# Patient Record
Sex: FEMALE | Race: WHITE | NOT HISPANIC OR LATINO | Employment: FULL TIME | URBAN - METROPOLITAN AREA
[De-identification: names, ages, dates, MRNs, and addresses within clinical notes are randomized per-mention and may not be internally consistent; named-entity substitution may affect disease eponyms.]

---

## 2018-08-13 ENCOUNTER — OFFICE VISIT (OUTPATIENT)
Dept: MEDICAL GROUP | Facility: PHYSICIAN GROUP | Age: 33
End: 2018-08-13
Payer: COMMERCIAL

## 2018-08-13 VITALS
SYSTOLIC BLOOD PRESSURE: 116 MMHG | HEIGHT: 64 IN | BODY MASS INDEX: 18.78 KG/M2 | WEIGHT: 110 LBS | DIASTOLIC BLOOD PRESSURE: 68 MMHG | RESPIRATION RATE: 18 BRPM | OXYGEN SATURATION: 100 % | HEART RATE: 71 BPM | TEMPERATURE: 98.7 F

## 2018-08-13 DIAGNOSIS — F41.1 GAD (GENERALIZED ANXIETY DISORDER): ICD-10-CM

## 2018-08-13 DIAGNOSIS — F32.81 PMDD (PREMENSTRUAL DYSPHORIC DISORDER): ICD-10-CM

## 2018-08-13 PROCEDURE — 99204 OFFICE O/P NEW MOD 45 MIN: CPT | Performed by: FAMILY MEDICINE

## 2018-08-13 RX ORDER — CITALOPRAM 20 MG/1
20 TABLET ORAL DAILY
Qty: 30 TAB | Refills: 3 | Status: SHIPPED | OUTPATIENT
Start: 2018-08-13 | End: 2018-12-04 | Stop reason: SDUPTHER

## 2018-08-13 RX ORDER — IBUPROFEN 200 MG
400 TABLET ORAL EVERY 6 HOURS PRN
COMMUNITY

## 2018-08-13 ASSESSMENT — ENCOUNTER SYMPTOMS
BLURRED VISION: 0
BRUISES/BLEEDS EASILY: 0
HEADACHES: 0
PALPITATIONS: 0
IRRITABILITY: 1
DIZZINESS: 0
HALLUCINATIONS: 0
TINGLING: 0
CHILLS: 0
COUGH: 0
INSOMNIA: 1
HEMOPTYSIS: 0
HEARTBURN: 0
MYALGIAS: 0
DOUBLE VISION: 0
NEUROLOGICAL NEGATIVE: 1
NAUSEA: 0
DEPRESSED MOOD: 1
GASTROINTESTINAL NEGATIVE: 1
RESPIRATORY NEGATIVE: 1
MUSCULOSKELETAL NEGATIVE: 1
DEPRESSION: 1
NERVOUS/ANXIOUS: 1
EYES NEGATIVE: 1
CARDIOVASCULAR NEGATIVE: 1
FEVER: 0

## 2018-08-13 ASSESSMENT — LIFESTYLE VARIABLES: SUBSTANCE_ABUSE: 0

## 2018-08-13 NOTE — PROGRESS NOTES
Subjective:      Bernie Smith is a 32 y.o. female who presents with Anxiety and Depression            Recently with anxiety and depression seems to be worse around her menses  No inciting social event  Works as a   Trouble both going to sleep and staying there  Will start on celexa and f/u in 4 weeks    1. NICK (generalized anxiety disorder)    - Multiple Vitamin (MULTI-VITAMIN DAILY PO); Take  by mouth.  - ibuprofen (MOTRIN) 200 MG Tab; Take 200 mg by mouth every 6 hours as needed.  - citalopram (CELEXA) 20 MG Tab; Take 1 Tab by mouth every day.  Dispense: 30 Tab; Refill: 3    2. PMDD (premenstrual dysphoric disorder)    - Multiple Vitamin (MULTI-VITAMIN DAILY PO); Take  by mouth.  - ibuprofen (MOTRIN) 200 MG Tab; Take 200 mg by mouth every 6 hours as needed.  - citalopram (CELEXA) 20 MG Tab; Take 1 Tab by mouth every day.  Dispense: 30 Tab; Refill: 3    History reviewed. No pertinent past medical history.  Past Surgical History:  No date: OPEN REDUCTION      Comment:  left tib fib  No date: TUBAL COAGULATION LAPAROSCOPIC BILATERAL  No date: TUBE & ECTOPIC PREG., REMOVAL; Left  Smoking status: Never Smoker                                                              Smokeless tobacco: Never Used                      Alcohol use: Yes           0.6 oz/week     Cans of beer: 1 per week    History reviewed.  No pertinent family history.      Current Outpatient Prescriptions: •  Multiple Vitamin (MULTI-VITAMIN DAILY PO), Take  by mouth., Disp: , Rfl: •  citalopram (CELEXA) 20 MG Tab, Take 1 Tab by mouth every day., Disp: 30 Tab, Rfl: 3•  ibuprofen (MOTRIN) 200 MG Tab, Take 200 mg by mouth every 6 hours as needed., Disp: , Rfl:     Patient was instructed on the use of medications, either prescriptions or OTC and informed on when the appropriate follow up time period should be. In addition, patient was also instructed that should any acute worsening occur that they should notify this clinic asap or call  "911.          Anxiety   Presents for initial visit. Onset was 1 to 6 months ago. The problem has been gradually worsening. Symptoms include depressed mood, excessive worry, insomnia, irritability and nervous/anxious behavior. Patient reports no chest pain, dizziness, nausea, palpitations or suicidal ideas. Symptoms occur most days. The severity of symptoms is causing significant distress. Nothing aggravates the symptoms. The quality of sleep is fair. Nighttime awakenings: several.     There are no known risk factors. There is no history of anemia or anxiety/panic attacks. Past treatments include lifestyle changes. The treatment provided no relief. Compliance with prior treatments has been good.       Review of Systems   Constitutional: Positive for irritability. Negative for chills and fever.   HENT: Negative.  Negative for hearing loss.    Eyes: Negative.  Negative for blurred vision and double vision.   Respiratory: Negative.  Negative for cough and hemoptysis.    Cardiovascular: Negative.  Negative for chest pain and palpitations.   Gastrointestinal: Negative.  Negative for heartburn and nausea.   Genitourinary: Negative.  Negative for dysuria.   Musculoskeletal: Negative.  Negative for myalgias.   Skin: Negative.  Negative for rash.   Neurological: Negative.  Negative for dizziness, tingling and headaches.   Endo/Heme/Allergies: Negative.  Does not bruise/bleed easily.   Psychiatric/Behavioral: Positive for depression. Negative for hallucinations, substance abuse and suicidal ideas. The patient is nervous/anxious and has insomnia.    All other systems reviewed and are negative.         Objective:     /68   Pulse 71   Temp 37.1 °C (98.7 °F)   Resp 18   Ht 1.619 m (5' 3.75\")   Wt 49.9 kg (110 lb)   SpO2 100%   BMI 19.03 kg/m²      Physical Exam   Constitutional: She is oriented to person, place, and time. She appears well-developed and well-nourished. No distress.   HENT:   Head: Normocephalic and " atraumatic.   Mouth/Throat: Oropharynx is clear and moist. No oropharyngeal exudate.   Eyes: Pupils are equal, round, and reactive to light.   Cardiovascular: Normal rate, regular rhythm, normal heart sounds and intact distal pulses.  Exam reveals no gallop and no friction rub.    No murmur heard.  Pulmonary/Chest: Effort normal and breath sounds normal. No respiratory distress. She has no wheezes. She has no rales. She exhibits no tenderness.   Neurological: She is alert and oriented to person, place, and time.   Skin: She is not diaphoretic.   Psychiatric: Her behavior is normal. Judgment and thought content normal. Her mood appears anxious. Thought content is not delusional. She exhibits a depressed mood. She expresses no suicidal plans and no homicidal plans.   Nursing note and vitals reviewed.              Assessment/Plan:     1. NICK (generalized anxiety disorder)    - Multiple Vitamin (MULTI-VITAMIN DAILY PO); Take  by mouth.  - ibuprofen (MOTRIN) 200 MG Tab; Take 200 mg by mouth every 6 hours as needed.  - citalopram (CELEXA) 20 MG Tab; Take 1 Tab by mouth every day.  Dispense: 30 Tab; Refill: 3    2. PMDD (premenstrual dysphoric disorder)    - Multiple Vitamin (MULTI-VITAMIN DAILY PO); Take  by mouth.  - ibuprofen (MOTRIN) 200 MG Tab; Take 200 mg by mouth every 6 hours as needed.  - citalopram (CELEXA) 20 MG Tab; Take 1 Tab by mouth every day.  Dispense: 30 Tab; Refill: 3

## 2018-09-20 ENCOUNTER — GYNECOLOGY VISIT (OUTPATIENT)
Dept: OBGYN | Facility: CLINIC | Age: 33
End: 2018-09-20
Payer: COMMERCIAL

## 2018-09-20 ENCOUNTER — HOSPITAL ENCOUNTER (OUTPATIENT)
Facility: MEDICAL CENTER | Age: 33
End: 2018-09-20
Attending: OBSTETRICS & GYNECOLOGY
Payer: COMMERCIAL

## 2018-09-20 VITALS — WEIGHT: 131 LBS | DIASTOLIC BLOOD PRESSURE: 60 MMHG | SYSTOLIC BLOOD PRESSURE: 102 MMHG | BODY MASS INDEX: 22.66 KG/M2

## 2018-09-20 DIAGNOSIS — Z01.419 WELL WOMAN EXAM: ICD-10-CM

## 2018-09-20 DIAGNOSIS — Z12.4 ROUTINE CERVICAL SMEAR: ICD-10-CM

## 2018-09-20 PROCEDURE — 88175 CYTOPATH C/V AUTO FLUID REDO: CPT

## 2018-09-20 PROCEDURE — 87624 HPV HI-RISK TYP POOLED RSLT: CPT

## 2018-09-20 PROCEDURE — 99385 PREV VISIT NEW AGE 18-39: CPT | Performed by: OBSTETRICS & GYNECOLOGY

## 2018-09-20 NOTE — PROGRESS NOTES
Annual examination; new patient  This patient is a 32 y.o. female  using BTL for birth control.        /60   Wt 59.4 kg (131 lb)   LMP 2018   BMI 22.66 kg/m²     Physical examination;  Breast examination- No dominant masses, No skin retraction, No axillary adenopathy    Pelvic examination;  External genitalia-No visible lesions  Vagina-No blood or discharge  Cervix-No gross lesions, Pap smear taken  Uterus-Normal size and shape,  No tenderness  Adnexa No mass or tenderness    Impression;  Normal annual    Plan;  [facility] for [facility]   Check PAP  Start Mammogram age 39 yrs.

## 2018-09-21 LAB
CYTOLOGY REG CYTOL: NORMAL
HPV HR 12 DNA CVX QL NAA+PROBE: NEGATIVE
HPV16 DNA SPEC QL NAA+PROBE: NEGATIVE
HPV18 DNA SPEC QL NAA+PROBE: NEGATIVE
SPECIMEN SOURCE: NORMAL

## 2018-12-04 ENCOUNTER — OFFICE VISIT (OUTPATIENT)
Dept: MEDICAL GROUP | Facility: PHYSICIAN GROUP | Age: 33
End: 2018-12-04
Payer: COMMERCIAL

## 2018-12-04 VITALS
SYSTOLIC BLOOD PRESSURE: 100 MMHG | HEIGHT: 63 IN | TEMPERATURE: 98.9 F | OXYGEN SATURATION: 98 % | HEART RATE: 74 BPM | BODY MASS INDEX: 19.31 KG/M2 | DIASTOLIC BLOOD PRESSURE: 62 MMHG | WEIGHT: 109 LBS

## 2018-12-04 DIAGNOSIS — F32.81 PMDD (PREMENSTRUAL DYSPHORIC DISORDER): ICD-10-CM

## 2018-12-04 DIAGNOSIS — F41.1 GAD (GENERALIZED ANXIETY DISORDER): ICD-10-CM

## 2018-12-04 PROCEDURE — 99213 OFFICE O/P EST LOW 20 MIN: CPT | Performed by: FAMILY MEDICINE

## 2018-12-04 RX ORDER — CITALOPRAM 40 MG/1
40 TABLET ORAL DAILY
Qty: 30 TAB | Refills: 3 | Status: SHIPPED | OUTPATIENT
Start: 2018-12-04 | End: 2022-04-18

## 2018-12-04 ASSESSMENT — PATIENT HEALTH QUESTIONNAIRE - PHQ9
CLINICAL INTERPRETATION OF PHQ2 SCORE: 2
5. POOR APPETITE OR OVEREATING: 1 - SEVERAL DAYS
SUM OF ALL RESPONSES TO PHQ QUESTIONS 1-9: 8

## 2018-12-05 NOTE — PROGRESS NOTES
Over 50% of this 15 minute visit was spent on counseling and coordination of care regarding the patient's current problem of   1. NICK (generalized anxiety disorder)  Improved with 20 mg dose for several months but seems to have leveled out again with some symptoms of anxiety and decreased mood and difficulty in managing emotions and trouble sleeping  Will increase dose and f/u in 6 weeks  No si/hi  - citalopram (CELEXA) 40 MG Tab; Take 1 Tab by mouth every day.  Dispense: 30 Tab; Refill: 3    2. PMDD (premenstrual dysphoric disorder)    - citalopram (CELEXA) 40 MG Tab; Take 1 Tab by mouth every day.  Dispense: 30 Tab; Refill: 3    Past Medical History:   Diagnosis Date   • Anemia    • Urinary tract infection      Past Surgical History:   Procedure Laterality Date   • CHOLECYSTECTOMY     • OPEN REDUCTION      left tib fib   • TUBAL COAGULATION LAPAROSCOPIC BILATERAL     • TUBAL LIGATION     • TUBE & ECTOPIC PREG., REMOVAL Left      Social History   Substance Use Topics   • Smoking status: Never Smoker   • Smokeless tobacco: Never Used   • Alcohol use 0.6 oz/week     1 Cans of beer per week     Family History   Problem Relation Age of Onset   • No Known Problems Mother    • No Known Problems Father    • No Known Problems Sister    • No Known Problems Brother          Current Outpatient Prescriptions:   •  citalopram (CELEXA) 40 MG Tab, Take 1 Tab by mouth every day., Disp: 30 Tab, Rfl: 3  •  Multiple Vitamin (MULTI-VITAMIN DAILY PO), Take  by mouth., Disp: , Rfl:   •  ibuprofen (MOTRIN) 200 MG Tab, Take 200 mg by mouth every 6 hours as needed., Disp: , Rfl:     Patient was instructed on the use of medications, either prescriptions or OTC and informed on when the appropriate follow up time period should be. In addition, patient was also instructed that should any acute worsening occur that they should notify this clinic asap or call 911.

## 2019-01-24 ENCOUNTER — OFFICE VISIT (OUTPATIENT)
Dept: MEDICAL GROUP | Facility: PHYSICIAN GROUP | Age: 34
End: 2019-01-24
Payer: COMMERCIAL

## 2019-01-24 VITALS
HEART RATE: 71 BPM | DIASTOLIC BLOOD PRESSURE: 60 MMHG | BODY MASS INDEX: 19.49 KG/M2 | SYSTOLIC BLOOD PRESSURE: 118 MMHG | RESPIRATION RATE: 12 BRPM | WEIGHT: 110 LBS | TEMPERATURE: 98.6 F | HEIGHT: 63 IN

## 2019-01-24 DIAGNOSIS — H65.193 ACUTE MEE (MIDDLE EAR EFFUSION), BILATERAL: ICD-10-CM

## 2019-01-24 DIAGNOSIS — R42 VERTIGO: ICD-10-CM

## 2019-01-24 PROCEDURE — 99214 OFFICE O/P EST MOD 30 MIN: CPT | Performed by: FAMILY MEDICINE

## 2019-01-24 RX ORDER — MECLIZINE HYDROCHLORIDE 25 MG/1
25 TABLET ORAL
Qty: 30 TAB | Refills: 1 | Status: SHIPPED | OUTPATIENT
Start: 2019-01-24 | End: 2023-08-31

## 2019-01-24 ASSESSMENT — ENCOUNTER SYMPTOMS
NAUSEA: 0
CARDIOVASCULAR NEGATIVE: 1
PSYCHIATRIC NEGATIVE: 1
SORE THROAT: 0
MYALGIAS: 0
DIZZINESS: 1
BLURRED VISION: 0
COUGH: 0
SWOLLEN GLANDS: 0
HEADACHES: 0
ARTHRALGIAS: 0
TINGLING: 0
ANOREXIA: 0
ABDOMINAL PAIN: 0
DEPRESSION: 0
HEARTBURN: 0
FEVER: 0
HEMOPTYSIS: 0
RESPIRATORY NEGATIVE: 1
CONSTITUTIONAL NEGATIVE: 1
PALPITATIONS: 0
CHILLS: 0
EYES NEGATIVE: 1
GASTROINTESTINAL NEGATIVE: 1
BRUISES/BLEEDS EASILY: 0
DOUBLE VISION: 0
CHANGE IN BOWEL HABIT: 0
MUSCULOSKELETAL NEGATIVE: 1

## 2019-01-24 ASSESSMENT — PATIENT HEALTH QUESTIONNAIRE - PHQ9: CLINICAL INTERPRETATION OF PHQ2 SCORE: 0

## 2019-01-25 NOTE — PROGRESS NOTES
Subjective:      Bernie Smith is a 33 y.o. female who presents with Dizziness            1. Vertigo    - meclizine (ANTIVERT) 25 MG Tab; Take 1 Tab by mouth at bedtime as needed for Dizziness.  Dispense: 30 Tab; Refill: 1    2. Acute NITIN (middle ear effusion), bilateral    - meclizine (ANTIVERT) 25 MG Tab; Take 1 Tab by mouth at bedtime as needed for Dizziness.  Dispense: 30 Tab; Refill: 1    Past Medical History:  No date: Anemia  No date: Urinary tract infection  Past Surgical History:  No date: CHOLECYSTECTOMY  No date: OPEN REDUCTION      Comment:  left tib fib  No date: TUBAL COAGULATION LAPAROSCOPIC BILATERAL  No date: TUBAL LIGATION  No date: TUBE & ECTOPIC PREG., REMOVAL; Left  Smoking status: Never Smoker                                                              Smokeless tobacco: Never Used                      Alcohol use: Yes           0.6 oz/week     Cans of beer: 1 per week    Review of patient's family history indicates:  Problem: No Known Problems      Relation: Mother       Age of Onset: (Not Specified)   Problem: No Known Problems      Relation: Father       Age of Onset: (Not Specified)   Problem: No Known Problems      Relation: Sister       Age of Onset: (Not Specified)   Problem: No Known Problems      Relation: Brother       Age of Onset: (Not Specified)       Current Outpatient Prescriptions: •  meclizine (ANTIVERT) 25 MG Tab, Take 1 Tab by mouth at bedtime as needed for Dizziness., Disp: 30 Tab, Rfl: 1•  citalopram (CELEXA) 40 MG Tab, Take 1 Tab by mouth every day., Disp: 30 Tab, Rfl: 3•  Multiple Vitamin (MULTI-VITAMIN DAILY PO), Take  by mouth., Disp: , Rfl: •  ibuprofen (MOTRIN) 200 MG Tab, Take 200 mg by mouth every 6 hours as needed., Disp: , Rfl:     Patient was instructed on the use of medications, either prescriptions or OTC and informed on when the appropriate follow up time period should be. In addition, patient was also instructed that should any acute worsening occur that they  "should notify this clinic asap or call 911.          Dizziness   This is a new problem. The current episode started in the past 7 days. The problem occurs intermittently. The problem has been waxing and waning. Pertinent negatives include no abdominal pain, anorexia, arthralgias, change in bowel habit, chest pain, chills, coughing, fever, headaches, myalgias, nausea, rash, sore throat or swollen glands. She has tried rest for the symptoms. The treatment provided no relief.       Review of Systems   Constitutional: Negative.  Negative for chills and fever.   HENT: Negative.  Negative for hearing loss and sore throat.    Eyes: Negative.  Negative for blurred vision and double vision.   Respiratory: Negative.  Negative for cough and hemoptysis.    Cardiovascular: Negative.  Negative for chest pain and palpitations.   Gastrointestinal: Negative.  Negative for abdominal pain, anorexia, change in bowel habit, heartburn and nausea.   Genitourinary: Negative.  Negative for dysuria.   Musculoskeletal: Negative.  Negative for arthralgias and myalgias.   Skin: Negative.  Negative for rash.   Neurological: Positive for dizziness. Negative for tingling and headaches.   Endo/Heme/Allergies: Negative.  Does not bruise/bleed easily.   Psychiatric/Behavioral: Negative.  Negative for depression and suicidal ideas.   All other systems reviewed and are negative.         Objective:     /60 (BP Location: Left arm, Patient Position: Sitting)   Pulse 71   Temp 37 °C (98.6 °F)   Resp 12   Ht 1.6 m (5' 3\")   Wt 49.9 kg (110 lb)   BMI 19.49 kg/m²      Physical Exam   Constitutional: She is oriented to person, place, and time. She appears well-developed and well-nourished. No distress.   HENT:   Head: Normocephalic and atraumatic.   Right Ear: A middle ear effusion is present.   Left Ear: A middle ear effusion is present.   Mouth/Throat: Oropharynx is clear and moist. No oropharyngeal exudate.   Eyes: Pupils are equal, round, and " reactive to light.   Cardiovascular: Normal rate, regular rhythm, normal heart sounds and intact distal pulses.  Exam reveals no gallop and no friction rub.    No murmur heard.  Pulmonary/Chest: Effort normal and breath sounds normal. No respiratory distress. She has no wheezes. She has no rales. She exhibits no tenderness.   Neurological: She is alert and oriented to person, place, and time.   Skin: She is not diaphoretic.   Psychiatric: She has a normal mood and affect. Her behavior is normal. Judgment and thought content normal.   Nursing note and vitals reviewed.              Assessment/Plan:     1. Vertigo    - meclizine (ANTIVERT) 25 MG Tab; Take 1 Tab by mouth at bedtime as needed for Dizziness.  Dispense: 30 Tab; Refill: 1    2. Acute NITIN (middle ear effusion), bilateral    - meclizine (ANTIVERT) 25 MG Tab; Take 1 Tab by mouth at bedtime as needed for Dizziness.  Dispense: 30 Tab; Refill: 1

## 2020-10-23 ENCOUNTER — OFFICE VISIT (OUTPATIENT)
Dept: URGENT CARE | Facility: CLINIC | Age: 35
End: 2020-10-23
Payer: COMMERCIAL

## 2020-10-23 ENCOUNTER — HOSPITAL ENCOUNTER (OUTPATIENT)
Facility: MEDICAL CENTER | Age: 35
End: 2020-10-23
Attending: PHYSICIAN ASSISTANT
Payer: COMMERCIAL

## 2020-10-23 VITALS
OXYGEN SATURATION: 97 % | HEART RATE: 62 BPM | BODY MASS INDEX: 20.19 KG/M2 | RESPIRATION RATE: 16 BRPM | SYSTOLIC BLOOD PRESSURE: 108 MMHG | WEIGHT: 114 LBS | TEMPERATURE: 98 F | DIASTOLIC BLOOD PRESSURE: 68 MMHG

## 2020-10-23 DIAGNOSIS — R31.9 HEMATURIA, UNSPECIFIED TYPE: ICD-10-CM

## 2020-10-23 DIAGNOSIS — R30.0 DYSURIA: ICD-10-CM

## 2020-10-23 DIAGNOSIS — R35.0 URINARY FREQUENCY: ICD-10-CM

## 2020-10-23 LAB
APPEARANCE UR: NORMAL
BILIRUB UR STRIP-MCNC: NEGATIVE MG/DL
COLOR UR AUTO: YELLOW
GLUCOSE UR STRIP.AUTO-MCNC: NEGATIVE MG/DL
INT CON NEG: NORMAL
INT CON POS: NORMAL
KETONES UR STRIP.AUTO-MCNC: NEGATIVE MG/DL
LEUKOCYTE ESTERASE UR QL STRIP.AUTO: NEGATIVE
NITRITE UR QL STRIP.AUTO: NEGATIVE
PH UR STRIP.AUTO: 7.5 [PH] (ref 5–8)
POC URINE PREGNANCY TEST: NEGATIVE
PROT UR QL STRIP: NEGATIVE MG/DL
RBC UR QL AUTO: NEGATIVE
SP GR UR STRIP.AUTO: 1.02
UROBILINOGEN UR STRIP-MCNC: 0.2 MG/DL

## 2020-10-23 PROCEDURE — 87186 SC STD MICRODIL/AGAR DIL: CPT

## 2020-10-23 PROCEDURE — 81002 URINALYSIS NONAUTO W/O SCOPE: CPT | Performed by: PHYSICIAN ASSISTANT

## 2020-10-23 PROCEDURE — 99214 OFFICE O/P EST MOD 30 MIN: CPT | Performed by: PHYSICIAN ASSISTANT

## 2020-10-23 PROCEDURE — 81025 URINE PREGNANCY TEST: CPT | Performed by: PHYSICIAN ASSISTANT

## 2020-10-23 PROCEDURE — 87086 URINE CULTURE/COLONY COUNT: CPT

## 2020-10-23 PROCEDURE — 87077 CULTURE AEROBIC IDENTIFY: CPT

## 2020-10-23 RX ORDER — CEPHALEXIN 500 MG/1
1000 CAPSULE ORAL EVERY 6 HOURS
Qty: 112 CAP | Refills: 0 | Status: SHIPPED | OUTPATIENT
Start: 2020-10-23 | End: 2020-11-06

## 2020-10-23 RX ORDER — NITROFURANTOIN 25; 75 MG/1; MG/1
100 CAPSULE ORAL EVERY 12 HOURS
Qty: 10 CAP | Refills: 0 | Status: SHIPPED | OUTPATIENT
Start: 2020-10-23 | End: 2020-10-28

## 2020-10-23 ASSESSMENT — ENCOUNTER SYMPTOMS
FLANK PAIN: 1
VOMITING: 0
HEADACHES: 0
DIAPHORESIS: 0
PALPITATIONS: 0
WEAKNESS: 0
CONSTIPATION: 0
DIZZINESS: 0
TINGLING: 0
CHILLS: 0
NAUSEA: 0
BLOOD IN STOOL: 0
WEIGHT LOSS: 0
DIARRHEA: 0
ABDOMINAL PAIN: 0
MYALGIAS: 0
SHORTNESS OF BREATH: 0
COUGH: 0
FEVER: 0

## 2020-10-23 NOTE — PROGRESS NOTES
Subjective:   Bernie Smith is a 35 y.o. female who presents for UTI (lower back pain, blood in the urine, urgency and burning started 6 days ago)      HPI:  This is a very pleasant 35-year-old female presenting to the clinic with dysuria x6 days.  Patient states she has mild burning with every urination.  She also experiences urinary frequency.  In the morning she notices mild hematuria.  Over the last 2 days she has had some nonspecific low back pain.  The pain is described as a dull ache.  She denies any radiating pain.  Pain is not brought on by movement.  She denies any associated fevers, chills, nausea, vomiting or abdominal pain.  She denies any vaginal rashes, lesions or discharge.  She has no concerns for STDs at this time.  She has been increasing fluid intake as well as taking Azo and drinking cranberry juice with minimal relief.  She states her symptoms started bad resolved for a day or so and now have returned.    Review of Systems   Constitutional: Negative for chills, diaphoresis, fever, malaise/fatigue and weight loss.   HENT: Negative for congestion.    Respiratory: Negative for cough and shortness of breath.    Cardiovascular: Negative for chest pain and palpitations.   Gastrointestinal: Negative for abdominal pain, blood in stool, constipation, diarrhea, melena, nausea and vomiting.   Genitourinary: Positive for dysuria, flank pain, frequency and hematuria. Negative for urgency.        Denies any vaginal rashes lesions or discharge.   Musculoskeletal: Negative for myalgias.   Skin: Negative for rash.   Neurological: Negative for dizziness, tingling, weakness and headaches.       Medications:    • cephALEXin Caps  • citalopram Tabs  • ibuprofen Tabs  • meclizine Tabs  • MULTI-VITAMIN DAILY PO  • nitrofurantoin Caps    Allergies: Morphine    Problem List: Bernie Smith does not have a problem list on file.    Surgical History:  Past Surgical History:   Procedure Laterality Date   • CHOLECYSTECTOMY      • OPEN REDUCTION      left tib fib   • TUBAL COAGULATION LAPAROSCOPIC BILATERAL     • TUBAL LIGATION     • TUBE & ECTOPIC PREG., REMOVAL Left        Past Social Hx: Bernie Smith  reports that she has never smoked. She has never used smokeless tobacco. She reports current alcohol use of about 0.6 oz of alcohol per week. She reports that she does not use drugs.     Past Family Hx:  Bernie Smith family history includes No Known Problems in her brother, father, mother, and sister.     Problem list, medications, and allergies reviewed by myself today in Epic.     Objective:     /68   Pulse 62   Temp 36.7 °C (98 °F)   Resp 16   Wt 51.7 kg (114 lb)   SpO2 97%   BMI 20.19 kg/m²     Physical Exam  Constitutional:       General: She is not in acute distress.     Appearance: Normal appearance. She is not ill-appearing, toxic-appearing or diaphoretic.   HENT:      Head: Normocephalic and atraumatic.      Mouth/Throat:      Mouth: Mucous membranes are moist.      Pharynx: No oropharyngeal exudate or posterior oropharyngeal erythema.   Eyes:      Conjunctiva/sclera: Conjunctivae normal.   Neck:      Musculoskeletal: Normal range of motion. No muscular tenderness.   Cardiovascular:      Rate and Rhythm: Normal rate and regular rhythm.      Pulses: Normal pulses.      Heart sounds: Normal heart sounds.   Pulmonary:      Effort: Pulmonary effort is normal.      Breath sounds: Normal breath sounds. No wheezing.   Abdominal:      General: Bowel sounds are normal. There is no distension.      Palpations: Abdomen is soft. There is no mass.      Tenderness: There is no abdominal tenderness. There is no right CVA tenderness, left CVA tenderness, guarding or rebound.      Hernia: No hernia is present.   Musculoskeletal: Normal range of motion.   Lymphadenopathy:      Cervical: No cervical adenopathy.   Skin:     General: Skin is warm and dry.      Capillary Refill: Capillary refill takes less than 2 seconds.   Neurological:       General: No focal deficit present.      Mental Status: She is alert and oriented to person, place, and time. Mental status is at baseline.   Psychiatric:         Mood and Affect: Mood normal.         Thought Content: Thought content normal.       Urine analysis: Within normal limits.  No leukocytes.  No nitrites.  No blood.  Urine culture: Pending  Urine hCG: Negative    Assessment/Plan:     Diagnosis and associated orders:     1. Dysuria  Urine Culture    POCT Urinalysis    POCT PREGNANCY    cephALEXin (KEFLEX) 500 MG Cap    nitrofurantoin (MACROBID) 100 MG Cap   2. Hematuria, unspecified type     3. Urinary frequency        Comments/MDM:     - Pt educated on preventative measures for avoiding future UTIs  - Advised to increase fluid intake  - OTC Pyridium (Azo) for symptomatic relief, advised that it will turn urine orange in color  -Patient is requesting to start on empiric antibiotic therapy while awaiting culture results.  - Pending urine culture  - ER precautions given regarding pyelonephritis including fevers greater than 101 and, vomiting and dehydration, increased back pain.           Differential diagnosis, natural history, supportive care, and indications for immediate follow-up discussed.    Advised the patient to follow-up with the primary care physician for recheck, reevaluation, and consideration of further management.    Please note that this dictation was created using voice recognition software. I have made reasonable attempt to correct obvious errors, but I expect that there are errors of grammar and possibly content that I did not discover before finalizing the note.    This note was electronically signed by SNEHAL Huggins PA-C

## 2020-10-24 DIAGNOSIS — R30.0 DYSURIA: ICD-10-CM

## 2020-10-26 LAB
BACTERIA UR CULT: ABNORMAL
BACTERIA UR CULT: ABNORMAL
SIGNIFICANT IND 70042: ABNORMAL
SITE SITE: ABNORMAL
SOURCE SOURCE: ABNORMAL

## 2022-05-12 PROBLEM — N92.4 MENORRHAGIA, PREMENOPAUSAL: Status: ACTIVE | Noted: 2022-05-12

## 2022-05-14 PROBLEM — N92.4 MENORRHAGIA, PREMENOPAUSAL: Status: RESOLVED | Noted: 2022-05-12 | Resolved: 2022-05-14

## 2022-05-14 PROBLEM — E61.1 IRON DEFICIENCY: Status: ACTIVE | Noted: 2022-05-14

## 2023-09-12 PROBLEM — E61.1 IRON DEFICIENCY: Status: RESOLVED | Noted: 2022-05-14 | Resolved: 2023-09-12

## 2023-09-18 ENCOUNTER — NON-PROVIDER VISIT (OUTPATIENT)
Dept: URGENT CARE | Facility: CLINIC | Age: 38
End: 2023-09-18

## 2023-09-18 DIAGNOSIS — Z02.1 PRE-EMPLOYMENT DRUG SCREENING: ICD-10-CM

## 2023-09-18 PROCEDURE — 8907 PR URINE COLLECT ONLY: Performed by: STUDENT IN AN ORGANIZED HEALTH CARE EDUCATION/TRAINING PROGRAM

## 2023-09-18 NOTE — PROGRESS NOTES
Pt in clinic for pre-employment UDS for [employer].    Specimen was collected without incident.    See scanned documentation for further.

## 2025-04-11 ENCOUNTER — APPOINTMENT (OUTPATIENT)
Dept: RADIOLOGY | Facility: MEDICAL CENTER | Age: 40
End: 2025-04-11
Attending: FAMILY MEDICINE
Payer: COMMERCIAL

## 2025-04-11 DIAGNOSIS — Z12.31 VISIT FOR SCREENING MAMMOGRAM: ICD-10-CM

## 2025-04-11 PROCEDURE — 77067 SCR MAMMO BI INCL CAD: CPT

## 2025-05-09 ENCOUNTER — HOSPITAL ENCOUNTER (OUTPATIENT)
Dept: RADIOLOGY | Facility: MEDICAL CENTER | Age: 40
End: 2025-05-09
Attending: FAMILY MEDICINE
Payer: COMMERCIAL

## 2025-05-09 DIAGNOSIS — R92.8 ABNORMAL MAMMOGRAM: ICD-10-CM

## 2025-05-09 PROCEDURE — 77065 DX MAMMO INCL CAD UNI: CPT | Mod: LT

## 2025-05-29 ENCOUNTER — HOSPITAL ENCOUNTER (OUTPATIENT)
Dept: RADIOLOGY | Facility: MEDICAL CENTER | Age: 40
End: 2025-05-29
Attending: FAMILY MEDICINE
Payer: COMMERCIAL

## 2025-05-29 DIAGNOSIS — R92.8 ABNORMAL FINDING ON BREAST IMAGING: ICD-10-CM

## 2025-05-29 LAB — PATHOLOGY CONSULT NOTE: NORMAL

## 2025-05-29 PROCEDURE — A4648 IMPLANTABLE TISSUE MARKER: HCPCS

## 2025-05-29 PROCEDURE — 88305 TISSUE EXAM BY PATHOLOGIST: CPT | Performed by: PATHOLOGY

## 2025-05-29 PROCEDURE — 88360 TUMOR IMMUNOHISTOCHEM/MANUAL: CPT | Mod: 91 | Performed by: PATHOLOGY

## 2025-06-03 ENCOUNTER — TELEPHONE (OUTPATIENT)
Facility: MEDICAL CENTER | Age: 40
End: 2025-06-03
Payer: COMMERCIAL

## 2025-06-04 ENCOUNTER — TELEPHONE (OUTPATIENT)
Facility: MEDICAL CENTER | Age: 40
End: 2025-06-04
Payer: COMMERCIAL

## 2025-06-04 DIAGNOSIS — R92.8 ABNORMAL FINDING ON BREAST IMAGING: Primary | ICD-10-CM

## 2025-06-04 DIAGNOSIS — R92.8 ABNORMAL MAMMOGRAM: ICD-10-CM

## 2025-06-04 NOTE — TELEPHONE ENCOUNTER
Attempted to contact the office of Dr. Terence Sanchez and got the answering service. Could not leave message. Faxed office path report and surgical referral request. Will contact office again to confirm receipt - tjs

## 2025-06-10 NOTE — Clinical Note
REFERRAL APPROVAL NOTICE         Sent on Kelsey 10, 2025                   Bernie Smith  [address]  [place] [address]                   Dear Ms. Smith,    After a careful review of the medical information and benefit coverage, [facility] has processed your referral. See below for additional details.    If applicable, you must be actively enrolled with your insurance for coverage of the authorized service. If you have any questions regarding your coverage, please contact your insurance directly.    REFERRAL INFORMATION   Referral #:  [id number]  Referred-To Department    Referred-By Provider:  Surgery    Clif New M.D.   [facility]      [address]  [address]  [facility] [address]  [phone] [address]  [facility] [id number] [phone]    Referral Start Date:  06/04/2025  Referral End Date:   06/04/2026           SCHEDULING  If you do not already have an appointment, please call [phone] to make an appointment.   MORE INFORMATION  As a reminder, [facility] ownership has changed, meaning this location is now owned and operated by [facility]. As such, we want to clarify that our patients should expect to receive two separate bills for the services received at [facility] - one representing the [facility] facility fees as the owner of the establishment, and the other to represent the physician's services and subsequent fees. You can speak with your insurance carrier for a pricing estimate by calling the customer service number on the back of your card and ask about charges for a hospital outpatient visit.  If you do not already have a [employer] account, sign up at: [email or web address]  You can access your medical information, make appointments, see lab results, billing information, and more.  If you have questions regarding this referral, please contact  the [facility] Referrals department at:              [phone]. Monday - Friday 7:30AM - 5:00PM.      Sincerely,  [facility]

## 2025-06-27 ENCOUNTER — OFFICE VISIT (OUTPATIENT)
Age: 40
End: 2025-06-27
Payer: COMMERCIAL

## 2025-06-27 ENCOUNTER — PATIENT OUTREACH (OUTPATIENT)
Dept: ONCOLOGY | Facility: MEDICAL CENTER | Age: 40
End: 2025-06-27

## 2025-06-27 VITALS
HEART RATE: 92 BPM | BODY MASS INDEX: 20.32 KG/M2 | DIASTOLIC BLOOD PRESSURE: 78 MMHG | WEIGHT: 119 LBS | SYSTOLIC BLOOD PRESSURE: 111 MMHG | OXYGEN SATURATION: 100 % | TEMPERATURE: 97.6 F | HEIGHT: 64 IN

## 2025-06-27 DIAGNOSIS — D05.12 DUCTAL CARCINOMA IN SITU (DCIS) OF LEFT BREAST: Primary | ICD-10-CM

## 2025-06-27 ASSESSMENT — ENCOUNTER SYMPTOMS: SLEEP DISTURBANCE: 1

## 2025-06-27 ASSESSMENT — FIBROSIS 4 INDEX: FIB4 SCORE: 0.98

## 2025-06-27 NOTE — PROGRESS NOTES
Subjective     Bernie Smith is a 39 y.o. female who presents for evaluation of a new diagnosis of left breast DCIS.  She reports that she was seen in the ED for left arm numbness and shoulder/neck pain, and was encouraged by Dr Panda to get a mammogram for routine screening due to her age.  She had no specific breast symptoms prior to the screening mammogram.    Routine self breast exams: No   Breast pain: No   Nipple discharge: No   Skin changes: No   Masses: No   Contour/nipple changes: No   Previous breast biopsy or surgery: Yes (Bilateral retropectoral implant augmentation 2019 by Dr Louis)    Age at menarche: 10  Age at menopause: premenopausal  Age at first birth: 19   (SAb 1, one set of twins)  Hormone replacement therapy: No     Family history of cancer: [facility] breast cancer age 78.     Lifetime (5-yr) breast cancer risk calculations  Tyrer-Cuzick v7: 12.4% (0.79%)  Tyrer-Cuzick v8: 10.09% (0.64%)  Jessenia model: 9.87% (0.55%)    Imaging  - Bilateral screening mammogram ([facility]) 2025: Left superior calcifications on MLO only, BIRADS 0, density C.  - Left diagnostic mammogram ([facility]) 2025: Left superior calcifications persist with some pleomorphism, BIRADS 4b, density B.  All imaging personally reviewed (internal and external images).    Pathology  Left breast stereotactic CNBx ([facility]) 2025: Ductal carcinoma in situ, grade 3, ER- VT-.  - Concordant. Tumark Q marker (migrated 2.3cm inferior to calcifications).  - No CC view obtained post-biopsy to confirm location of biopsy site.  - The patient was given a printed copy of her pathology report.    Past Medical History   Past Medical History[1]    Surgical History  Past Surgical History[2]    Family History  Family History   Problem Relation Age of Onset    Thyroid cancer Mother 25    No Known Problems Father     No Known Problems Sister     No Known Problems Brother     Breast Cancer Maternal Grandmother 78       Social  "History  Social History     Socioeconomic History    Marital status: Single     Spouse name: Not on file    Number of children: Not on file    Years of education: Not on file    Highest education level: Not on file   Occupational History    Not on file   Tobacco Use    Smoking status: Never    Smokeless tobacco: Former     Types: Chew     Quit date: 4/18/2002   Vaping Use    Vaping status: Never Used   Substance and Sexual Activity    Alcohol use: Yes     Alcohol/week: 1.2 oz     Types: 2 Glasses of wine per week     Comment: socially    Drug use: No    Sexual activity: Yes     Partners: Male     Birth control/protection: Other-See Comments     Comment: Tubal ligation 2014   Other Topics Concern    Not on file   Social History Narrative    Not on file     Social Drivers of Health     Financial Resource Strain: Not on file   Food Insecurity: Not on file   Transportation Needs: Not on file   Physical Activity: Not on file   Stress: Not on file   Social Connections: Not on file   Intimate Partner Violence: Not on file   Housing Stability: Unknown (3/28/2025)    Housing Stability Vital Sign     Unable to Pay for Housing in the Last Year: Not on file     Number of Times Moved in the Last Year: Not on file     Homeless in the Last Year: No       Review of Systems  Review of Systems   Psychiatric/Behavioral:  Positive for sleep disturbance.    All other systems reviewed and are negative.       Objective   /78 (BP Location: Right arm, Patient Position: Sitting, BP Cuff Size: Large adult)   Pulse 92   Temp 36.4 °C (97.6 °F) (Temporal)   Ht 1.626 m (5' 4\")   Wt 54 kg (119 lb)   LMP  (LMP Unknown)   SpO2 100%   BMI 20.43 kg/m²    Physical Exam  Vitals and nursing note reviewed.   Constitutional:       General: She is not in acute distress.     Appearance: Normal appearance.   HENT:      Head: Normocephalic and atraumatic.      Right Ear: External ear normal.      Left Ear: External ear normal.      Nose: Nose " normal.      Mouth/Throat:      Pharynx: Oropharynx is clear.   Eyes:      General: No scleral icterus.     Conjunctiva/sclera: Conjunctivae normal.   Cardiovascular:      Rate and Rhythm: Normal rate and regular rhythm.      Heart sounds: Normal heart sounds. No murmur heard.     No friction rub. No gallop.   Pulmonary:      Effort: Pulmonary effort is normal. No respiratory distress.      Breath sounds: Normal breath sounds. No wheezing, rhonchi or rales.   Chest:   Breasts:     Raul Score is 5.      Breasts are symmetrical.      Right: Normal. No swelling, bleeding, inverted nipple, mass, nipple discharge or skin change.      Left: Normal. No swelling, bleeding, inverted nipple, mass, nipple discharge or skin change.          Comments: Bilateral breasts examined in the upright and supine positions.  No suspicious skin changes (erythema, peau d'orange).  No unexpected contour abnormalities.  Bilateral well-healed inframammary fold incisions with retropectoral implant augmentation present.  Bilateral breast tissue heterogeneously dense with no dominant masses or nodules.  Bilateral nipples everted without expressible discharge.  No palpable cervical, supraclavicular, or axillary adenopathy bilaterally.  Bedside ultrasound performed with the Rashmi 18mHz linear probe; areas of possible biopsy change are seen in the left superior breast but no definitive biopsy site, no obvious calcifications, and no definitive candidate for the Q marker seen.  No axillary adenopathy by ultrasound.  Abdominal:      General: Abdomen is flat. There is no distension.      Palpations: Abdomen is soft. There is no mass.   Musculoskeletal:         General: No swelling or deformity. Normal range of motion.      Cervical back: Neck supple.   Lymphadenopathy:      Cervical: No cervical adenopathy.      Upper Body:      Right upper body: No supraclavicular or axillary adenopathy.      Left upper body: No supraclavicular or axillary  adenopathy.   Skin:     General: Skin is warm and dry.      Capillary Refill: Capillary refill takes less than 2 seconds.   Neurological:      General: No focal deficit present.      Mental Status: She is alert and oriented to person, place, and time.   Psychiatric:         Mood and Affect: Mood normal.         Behavior: Behavior normal.         Thought Content: Thought content normal.         Judgment: Judgment normal.     [facility] ECOG Performance Status categories: 0= Fully active, able to carry on all pre-disease performance without restriction.  FUNCTIONAL ASSESSMENT  Patient responses to questions:  - Have you ever had shoulder surgery or been treated for a shoulder injury that resulted in a loss of range of motion?  No   - Are you unable to reach into an upper cabinet and retrieve a cup or plate?  No   - Do you have any limitations in daily activities because of your shoulder?  No   Overhead raise test for shoulder flexion:  Normal Range:  150-180 degrees    Assessment & Plan   The patient is a delightful 39 y.o. 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  This is clinical stage cTis N0 M0 (stage 0), spanning ~1.3 cm by span of calcifications, with clinically negative axillary lymph nodes.  The tumor is grade 3, estrogen receptor negative (0 %), progesterone receptor negative (0 %).  The cancer type, staging, and biomarkers were all explained in detail to the patient.    We discussed the development, progression, and natural history of untreated breast cancer, including the potential for progression to metastatic disease and death.  Given her current stage of 0 I think that this tumor is very treatable and her prognosis is very good.  We discussed national standards and guidelines for treatment of breast cancer, and specified that we follow NCCN evidence-based guidelines for multidisciplinary breast cancer treatment.  Based on these guidelines, systemic staging imaging is not  appropriate at this time.  I discussed medical oncology and radiation oncology treatments briefly, and how I expect them to be applied in her particular case.    We discussed surgical options, including mastectomy with or without reconstruction and partial mastectomy (lumpectomy).  We discussed that there is no difference in survival between mastectomy and lumpectomy, but that there is an increased risk of local recurrence with lumpectomy for which we give radiation after surgery.  We discussed that breast conservation and radiation may confer a small survival benefit according to new data.  We discussed lymphatic sampling and the indications for sentinel lymph node biopsy, axillary dissection, and scenarios in which lymphatic sampling may be omitted.  In her particular case, I recommend proceeding with breast conservation.   All questions answered in detail.  A thorough discussion was held with the patient of the indications, alternatives, risks (including lymphedema, positive margins, bleeding, infection, anesthetic complications, and the potential need for more than one operation), as well as the expected outcomes.  After this discussion with shared decision-making, the patient would like to proceed with surgery scheduling for left mammographic wire-localized partial mastectomy.      Before proceeding with definitive treatment, she will need bilateral breast MRI to confirm the extent of disease and that breast conservation is feasible.  This has been ordered STAT.    We discussed the option of genetic counseling and genetic testing.  She meets NCCN criteria for genetic testing due to her age.  At this point the patient is interested in proceeding; referral placed to [employer].    We discussed that there is a significant survival benefit to dedicated exercise throughout the treatment of breast cancer and she was given information on community exercise programs that focus on cancer survivorship.    The patient  "has the following potential barriers to care: No identified barriers today.  She is getting  in [place] 07/17/2025 and would like to have surgery scheduling for after this; this is a very reasonable request.    We discussed postoperative pain and that we will utilize a multimodal approach to pain control that may include preoperative medications given before surgery, intraoperative nerve blocks and local anesthetic, nonopiate pain medications during and after surgery, and postoperative nonopiate pain medications including antiinflammatory medication and acetaminophen.  If these measures are inadequate to control her pain, opiate medications may be used on a short-term basis to aid in postoperative recovery and mobilization.  The patient does not have a history of chronic opiate use or substance abuse and has been educated about avoiding use of controlled substances with alcohol, marijuana, and/or illicit substances.  A drug utilization report will be fully reviewed prior to providing a prescription for any controlled substance if that prescription is deemed necessary.     A total of 116 minutes were spent on and with this patient today, including review of records, independent review of imaging, history and physical exam, counseling, documentation of exam, and coordination of care.  The patient was given a copy of her percutaneous biopsy pathology report, a filled-out \"Your Guide to Understanding the Diagnosis of Breast Cancer\" booklet adapted from the [email or web address].org version, and a personalized flight plan including her diagnosis, imaging, and detailed plan.  A referral was placed to cancer navigation services.      Problem   Ductal Carcinoma in Situ (Dcis) of Left Breast    Left breast superior DCIS, G3, ER-SD-, [id number] (stage 0)  - Stereotactic CNBx 05/29/2025  - MRI pending  - Genetics pending  - Planned left partial mastectomy ([facility])  - Planned postop referral to rad onc       Cancer Staging   Ductal " carcinoma in situ (DCIS) of left breast  Staging form: Breast, AJCC 8th Edition  - Clinical stage from 5/29/2025: Stage 0 (cTis (DCIS), cN0, cM0, ER-, AL-, HER2: Not Assessed) - Signed by Daina Burton M.D. on 6/27/2025           [1]   Past Medical History:  Diagnosis Date    ADHD (attention deficit hyperactivity disorder)     Anemia     Anxiety and depression 04/18/2022    Dental disorder 04/18/2022    top left chip tooth - may be fixed by DOS    Gynecological disorder 04/18/2022    excessive uterine bleeding    PONV (postoperative nausea and vomiting)     Urinary tract infection    [2]   Past Surgical History:  Procedure Laterality Date    BLADDER SLING FEMALE N/A 9/12/2023    Procedure: SOLYX MID URETHRAL SLING, CYSTOSCOPY;  Surgeon: Terence Sanchez M.D.;  Location: SURG[facility];  Service: Gynecology    HYSTERECTOMY ROBOTIC XI N/A 05/13/2022    Procedure: DAVINCI TOTAL LAPAROSCOPIC HYSTERECTOMY, BILATERAL SALPINGECTOMY, VAGINAL VAULT SUSPENSION, CYSTOSCOPY;  Surgeon: Terence Sanchez M.D.;  Location: SURGERY [place];  Service: Gynecology    OPEN REDUCTION      left tib fib    OTHER      breast augmentin in 2019    TUBAL COAGULATION LAPAROSCOPIC BILATERAL      TUBE & ECTOPIC PREG., REMOVAL Left

## 2025-06-27 NOTE — PROGRESS NOTES
Oncology Nurse Navigation Assessment  Met with pt after surgical consult.  She shares she will be getting  on 7/17/25.  She is a dispatcher for [email or web address].    DX: DCIS of the left breast  HR negative    POC: left partial mastectomy / wire loc    FAMHX: Cancer-related family history is not on file.  Referred to genetics.           BARRIERS ASSESSMENT:    TRANSPORTATION: denies barriers.   will accompany her to surgery.  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   FINANCIAL:  denies barriers  INSURANCE:  [employer]  SUPPORT SYSTEM:  sister in OR is an oncology nurse.   will be available to assist post op.  Has four children ages 20, 18 and twins age 11.  PSYCHOLOGICAL: DST by Breast Surgery - denies barriers  COMMUNICATION: no barrier noted    FAMILY CARE:  denies barriers  SELF CARE:  denies barriers         INTERVENTION:  Intro letter and support services flier provided  Pt agrees to contact ONN should she need additional support or resources.

## 2025-06-27 NOTE — LETTER
[facility]    [address]  [place] [address]  Phone: [phone] - Fax: [phone]              Bernie Subramanianes  [address] [place] [address]     Date: 06/27/25  Medical Record Number: [id number]    Dear Bernie,    I am a Cancer Nurse Navigator, a certified oncology nurse. My role is to assess any needs you may have with education, guidance and support. I am available to you and your family through your treatment at [facility].       I am available to address your needs during your journey with the following services:     Care Coordination  I can assist you in facilitating communication between your cancer care treatment team to ensure timely treatment and follow-up.  I can also assist with transition of care back to your primary care provider, or other specialist, as needed.  My goal is to bridge gaps for you throughout the course of your active treatment.       Education Services  Understanding the recommended treatment course by your physician is key. I can provide educational resources personalized to your cancer diagnosis to help you understand your diagnosis and treatment. Please let me know if you would like to receive information about your diagnosis and treatment plan.  I am here to help.     Support Services/Resource Information  [facility] we offer a full scope of support services.  I can assist you with referral information to:  Cancer Clinical Trials & Research  Nutrition counseling  Support groups  Complementary Therapies such as Mind-Body Techniques Meditation  Patient Financial Advocates    [facility], an [employer] affiliate office, our volunteers can assist you with accessing our [employer] library, support services information, head coverings and comfort items  Community and national resources, included eligibility based andres assistance and pharmaceutical access programs, if you are in need of  additional information.     [facility] offers services that include:  Behavioral Health  Genetic counseling & testing  Acupuncture  Lymphedema prevention/treatment program  Palliative care services.        I hope you have an excellent patient experience.  Please feel free to share with me your comments regarding the care you have received- we value your feedback.    Sincerely,     Xiang Gimenez R.N.  Cancer Nurse Navigator    Office: [phone] / [phone]    Email:  [email or web address]    If you would like to attend our Breast Cancer Treatment Orientation Class please call [phone] or visit [email or web address]/events to register.

## 2025-07-01 ENCOUNTER — HOSPITAL ENCOUNTER (OUTPATIENT)
Dept: RADIOLOGY | Facility: MEDICAL CENTER | Age: 40
End: 2025-07-01
Attending: SURGERY
Payer: COMMERCIAL

## 2025-07-01 DIAGNOSIS — D05.12 DUCTAL CARCINOMA IN SITU (DCIS) OF LEFT BREAST: ICD-10-CM

## 2025-07-01 PROCEDURE — 77049 MRI BREAST C-+ W/CAD BI: CPT

## 2025-07-01 PROCEDURE — 700117 HCHG RX CONTRAST REV CODE 255: Mod: JZ | Performed by: SURGERY

## 2025-07-01 PROCEDURE — A9579 GAD-BASE MR CONTRAST NOS,1ML: HCPCS | Mod: JZ | Performed by: SURGERY

## 2025-07-01 RX ORDER — GADOTERIDOL 279.3 MG/ML
20 INJECTION INTRAVENOUS ONCE
Status: COMPLETED | OUTPATIENT
Start: 2025-07-01 | End: 2025-07-01

## 2025-07-01 RX ADMIN — GADOTERIDOL 20 ML: 279.3 INJECTION, SOLUTION INTRAVENOUS at 18:31

## 2025-07-02 ENCOUNTER — RESULTS FOLLOW-UP (OUTPATIENT)
Age: 40
End: 2025-07-02
Payer: COMMERCIAL

## 2025-07-02 ENCOUNTER — DOCUMENTATION (OUTPATIENT)
Age: 40
End: 2025-07-02
Payer: COMMERCIAL

## 2025-07-02 DIAGNOSIS — D05.12 DUCTAL CARCINOMA IN SITU (DCIS) OF LEFT BREAST: ICD-10-CM

## 2025-07-02 DIAGNOSIS — R92.8 ABNORMAL FINDINGS ON DIAGNOSTIC IMAGING OF BREAST: Primary | ICD-10-CM

## 2025-07-21 ENCOUNTER — HOSPITAL ENCOUNTER (OUTPATIENT)
Facility: MEDICAL CENTER | Age: 40
End: 2025-07-21
Attending: SURGERY
Payer: COMMERCIAL

## 2025-07-21 ENCOUNTER — RESULTS FOLLOW-UP (OUTPATIENT)
Age: 40
End: 2025-07-21
Payer: COMMERCIAL

## 2025-07-21 ENCOUNTER — NON-PROVIDER VISIT (OUTPATIENT)
Dept: GENETICS | Facility: MEDICAL CENTER | Age: 40
End: 2025-07-21
Payer: COMMERCIAL

## 2025-07-21 DIAGNOSIS — D05.12 DUCTAL CARCINOMA IN SITU (DCIS) OF LEFT BREAST: ICD-10-CM

## 2025-07-21 DIAGNOSIS — R92.8 ABNORMAL FINDINGS ON DIAGNOSTIC IMAGING OF BREAST: ICD-10-CM

## 2025-07-21 DIAGNOSIS — R92.8 ABNORMAL FINDINGS ON DIAGNOSTIC IMAGING OF BREAST: Primary | ICD-10-CM

## 2025-07-21 PROCEDURE — 76642 ULTRASOUND BREAST LIMITED: CPT | Mod: LT

## 2025-07-21 PROCEDURE — 36415 COLL VENOUS BLD VENIPUNCTURE: CPT

## 2025-07-21 NOTE — PROGRESS NOTES
Bernie Smith is a 39 y.o. female here for a non-provider visit for: Lab Draws  on 7/21/2025 at 9:34 AM    Procedure Performed: Venipuncture     Anatomical site: Right Antecubital Area (AC)    Equipment used: 25 butterfly    Labs drawn: Emcore (two lavender EDTA tubes)    Ordering Provider: [facility]    [place] By: Kamila Richey, Med Ass't

## 2025-07-22 ENCOUNTER — DOCUMENTATION (OUTPATIENT)
Age: 40
End: 2025-07-22
Payer: COMMERCIAL

## 2025-09-04 ENCOUNTER — APPOINTMENT (OUTPATIENT)
Age: 40
End: 2025-09-04
Payer: COMMERCIAL